# Patient Record
Sex: MALE | Race: WHITE | NOT HISPANIC OR LATINO
[De-identification: names, ages, dates, MRNs, and addresses within clinical notes are randomized per-mention and may not be internally consistent; named-entity substitution may affect disease eponyms.]

---

## 2018-01-16 PROBLEM — Z00.00 ENCOUNTER FOR PREVENTIVE HEALTH EXAMINATION: Status: ACTIVE | Noted: 2018-01-16

## 2018-05-30 ENCOUNTER — APPOINTMENT (OUTPATIENT)
Dept: CARDIOLOGY | Facility: CLINIC | Age: 83
End: 2018-05-30
Payer: COMMERCIAL

## 2018-05-30 ENCOUNTER — NON-APPOINTMENT (OUTPATIENT)
Age: 83
End: 2018-05-30

## 2018-05-30 VITALS
SYSTOLIC BLOOD PRESSURE: 138 MMHG | WEIGHT: 171 LBS | HEART RATE: 56 BPM | BODY MASS INDEX: 24.48 KG/M2 | HEIGHT: 70 IN | DIASTOLIC BLOOD PRESSURE: 75 MMHG | RESPIRATION RATE: 14 BRPM

## 2018-05-30 DIAGNOSIS — Q39.6 CONGENITAL DIVERTICULUM OF ESOPHAGUS: ICD-10-CM

## 2018-05-30 DIAGNOSIS — Z82.49 FAMILY HISTORY OF ISCHEMIC HEART DISEASE AND OTHER DISEASES OF THE CIRCULATORY SYSTEM: ICD-10-CM

## 2018-05-30 DIAGNOSIS — Z85.46 PERSONAL HISTORY OF MALIGNANT NEOPLASM OF PROSTATE: ICD-10-CM

## 2018-05-30 DIAGNOSIS — Z83.3 FAMILY HISTORY OF DIABETES MELLITUS: ICD-10-CM

## 2018-05-30 DIAGNOSIS — Z87.448 PERSONAL HISTORY OF OTHER DISEASES OF URINARY SYSTEM: ICD-10-CM

## 2018-05-30 PROCEDURE — 99213 OFFICE O/P EST LOW 20 MIN: CPT

## 2018-05-30 PROCEDURE — 93000 ELECTROCARDIOGRAM COMPLETE: CPT

## 2018-06-25 ENCOUNTER — LABORATORY RESULT (OUTPATIENT)
Age: 83
End: 2018-06-25

## 2018-06-26 ENCOUNTER — APPOINTMENT (OUTPATIENT)
Dept: CARDIOLOGY | Facility: CLINIC | Age: 83
End: 2018-06-26
Payer: COMMERCIAL

## 2018-06-26 ENCOUNTER — NON-APPOINTMENT (OUTPATIENT)
Age: 83
End: 2018-06-26

## 2018-06-26 VITALS
SYSTOLIC BLOOD PRESSURE: 128 MMHG | HEART RATE: 117 BPM | RESPIRATION RATE: 16 BRPM | DIASTOLIC BLOOD PRESSURE: 81 MMHG | WEIGHT: 181 LBS | HEIGHT: 72 IN | BODY MASS INDEX: 24.52 KG/M2

## 2018-06-26 PROCEDURE — 93000 ELECTROCARDIOGRAM COMPLETE: CPT | Mod: 59

## 2018-06-26 PROCEDURE — 93224 XTRNL ECG REC UP TO 48 HRS: CPT

## 2018-06-26 PROCEDURE — 99214 OFFICE O/P EST MOD 30 MIN: CPT

## 2018-06-29 ENCOUNTER — NON-APPOINTMENT (OUTPATIENT)
Age: 83
End: 2018-06-29

## 2018-07-09 ENCOUNTER — TRANSCRIPTION ENCOUNTER (OUTPATIENT)
Age: 83
End: 2018-07-09

## 2018-07-16 ENCOUNTER — APPOINTMENT (OUTPATIENT)
Dept: CARDIOLOGY | Facility: CLINIC | Age: 83
End: 2018-07-16
Payer: COMMERCIAL

## 2018-07-16 VITALS
RESPIRATION RATE: 16 BRPM | DIASTOLIC BLOOD PRESSURE: 80 MMHG | HEIGHT: 70 IN | SYSTOLIC BLOOD PRESSURE: 130 MMHG | HEART RATE: 54 BPM | WEIGHT: 167 LBS | BODY MASS INDEX: 23.91 KG/M2

## 2018-07-16 PROCEDURE — 99213 OFFICE O/P EST LOW 20 MIN: CPT

## 2018-07-23 ENCOUNTER — APPOINTMENT (OUTPATIENT)
Dept: CARDIOLOGY | Facility: CLINIC | Age: 83
End: 2018-07-23

## 2018-08-13 ENCOUNTER — APPOINTMENT (OUTPATIENT)
Dept: CARDIOLOGY | Facility: CLINIC | Age: 83
End: 2018-08-13
Payer: COMMERCIAL

## 2018-08-13 VITALS
WEIGHT: 167 LBS | DIASTOLIC BLOOD PRESSURE: 64 MMHG | HEART RATE: 44 BPM | BODY MASS INDEX: 23.38 KG/M2 | HEIGHT: 71 IN | SYSTOLIC BLOOD PRESSURE: 124 MMHG | RESPIRATION RATE: 15 BRPM

## 2018-08-13 PROCEDURE — 93306 TTE W/DOPPLER COMPLETE: CPT

## 2018-08-13 PROCEDURE — 99213 OFFICE O/P EST LOW 20 MIN: CPT

## 2018-10-08 ENCOUNTER — LABORATORY RESULT (OUTPATIENT)
Age: 83
End: 2018-10-08

## 2018-10-09 ENCOUNTER — APPOINTMENT (OUTPATIENT)
Dept: CARDIOLOGY | Facility: CLINIC | Age: 83
End: 2018-10-09
Payer: COMMERCIAL

## 2018-10-09 ENCOUNTER — NON-APPOINTMENT (OUTPATIENT)
Age: 83
End: 2018-10-09

## 2018-10-09 VITALS
WEIGHT: 170 LBS | RESPIRATION RATE: 15 BRPM | SYSTOLIC BLOOD PRESSURE: 132 MMHG | HEART RATE: 41 BPM | BODY MASS INDEX: 23.8 KG/M2 | DIASTOLIC BLOOD PRESSURE: 84 MMHG | HEIGHT: 71 IN

## 2018-10-09 PROCEDURE — 99213 OFFICE O/P EST LOW 20 MIN: CPT

## 2018-10-09 PROCEDURE — 93000 ELECTROCARDIOGRAM COMPLETE: CPT

## 2018-10-09 RX ORDER — OXYBUTYNIN CHLORIDE 5 MG/1
5 TABLET ORAL DAILY
Refills: 0 | Status: ACTIVE | COMMUNITY

## 2018-10-09 RX ORDER — TAMSULOSIN HYDROCHLORIDE 0.4 MG/1
0.4 CAPSULE ORAL
Qty: 90 | Refills: 3 | Status: ACTIVE | COMMUNITY
Start: 2018-10-09

## 2018-10-09 RX ORDER — FAMOTIDINE 40 MG/1
40 TABLET, FILM COATED ORAL TWICE DAILY
Refills: 0 | Status: ACTIVE | COMMUNITY
Start: 2018-10-09

## 2018-10-09 RX ORDER — SODIUM BICARBONATE 650 MG/1
650 TABLET ORAL 3 TIMES DAILY
Refills: 0 | Status: ACTIVE | COMMUNITY

## 2018-10-09 RX ORDER — CALCITRIOL 0.25 UG/1
0.25 CAPSULE, LIQUID FILLED ORAL DAILY
Refills: 0 | Status: ACTIVE | COMMUNITY
Start: 2018-10-09

## 2018-10-09 RX ORDER — DOXAZOSIN 1 MG/1
1 TABLET ORAL DAILY
Refills: 0 | Status: ACTIVE | COMMUNITY
Start: 2018-10-09

## 2018-10-12 ENCOUNTER — CLINICAL ADVICE (OUTPATIENT)
Age: 83
End: 2018-10-12

## 2018-12-03 ENCOUNTER — RX CHANGE (OUTPATIENT)
Age: 83
End: 2018-12-03

## 2018-12-26 ENCOUNTER — RX RENEWAL (OUTPATIENT)
Age: 83
End: 2018-12-26

## 2019-03-08 ENCOUNTER — APPOINTMENT (OUTPATIENT)
Dept: CARDIOLOGY | Facility: CLINIC | Age: 84
End: 2019-03-08
Payer: MEDICARE

## 2019-03-08 ENCOUNTER — NON-APPOINTMENT (OUTPATIENT)
Age: 84
End: 2019-03-08

## 2019-03-08 VITALS
DIASTOLIC BLOOD PRESSURE: 64 MMHG | HEART RATE: 41 BPM | RESPIRATION RATE: 16 BRPM | SYSTOLIC BLOOD PRESSURE: 124 MMHG | WEIGHT: 168 LBS | BODY MASS INDEX: 23.52 KG/M2 | HEIGHT: 71 IN

## 2019-03-08 DIAGNOSIS — Z87.891 PERSONAL HISTORY OF NICOTINE DEPENDENCE: ICD-10-CM

## 2019-03-08 PROCEDURE — 99214 OFFICE O/P EST MOD 30 MIN: CPT

## 2019-03-08 PROCEDURE — 93880 EXTRACRANIAL BILAT STUDY: CPT

## 2019-03-08 PROCEDURE — 93000 ELECTROCARDIOGRAM COMPLETE: CPT

## 2019-07-10 ENCOUNTER — APPOINTMENT (OUTPATIENT)
Dept: CARDIOLOGY | Facility: CLINIC | Age: 84
End: 2019-07-10
Payer: MEDICARE

## 2019-07-10 ENCOUNTER — NON-APPOINTMENT (OUTPATIENT)
Age: 84
End: 2019-07-10

## 2019-07-10 VITALS
SYSTOLIC BLOOD PRESSURE: 124 MMHG | RESPIRATION RATE: 15 BRPM | WEIGHT: 168 LBS | HEIGHT: 71 IN | DIASTOLIC BLOOD PRESSURE: 78 MMHG | BODY MASS INDEX: 23.52 KG/M2 | HEART RATE: 48 BPM

## 2019-07-10 DIAGNOSIS — R06.09 OTHER FORMS OF DYSPNEA: ICD-10-CM

## 2019-07-10 PROCEDURE — 99213 OFFICE O/P EST LOW 20 MIN: CPT

## 2019-07-10 PROCEDURE — 93000 ELECTROCARDIOGRAM COMPLETE: CPT

## 2019-07-12 RX ORDER — AVOBENZONE, HOMOSALATE, OCTISALATE, OCTOCRYLENE, AND OXYBENZONE 29.4; 147; 49; 25.4; 58.8 MG/ML; MG/ML; MG/ML; MG/ML; MG/ML
51.7 LOTION TOPICAL DAILY
Refills: 0 | Status: DISCONTINUED | COMMUNITY
Start: 2018-10-09 | End: 2019-07-12

## 2019-08-12 ENCOUNTER — RX RENEWAL (OUTPATIENT)
Age: 84
End: 2019-08-12

## 2019-11-12 ENCOUNTER — NON-APPOINTMENT (OUTPATIENT)
Age: 84
End: 2019-11-12

## 2019-11-12 ENCOUNTER — APPOINTMENT (OUTPATIENT)
Dept: CARDIOLOGY | Facility: CLINIC | Age: 84
End: 2019-11-12
Payer: MEDICARE

## 2019-11-12 VITALS
HEART RATE: 23 BPM | SYSTOLIC BLOOD PRESSURE: 130 MMHG | DIASTOLIC BLOOD PRESSURE: 75 MMHG | WEIGHT: 136 LBS | BODY MASS INDEX: 18.42 KG/M2 | RESPIRATION RATE: 16 BRPM | HEIGHT: 72 IN

## 2019-11-12 PROCEDURE — 99214 OFFICE O/P EST MOD 30 MIN: CPT

## 2019-11-12 PROCEDURE — 93000 ELECTROCARDIOGRAM COMPLETE: CPT

## 2019-11-12 RX ORDER — METOPROLOL TARTRATE 25 MG/1
25 TABLET, FILM COATED ORAL
Qty: 90 | Refills: 1 | Status: COMPLETED | COMMUNITY
Start: 2018-07-16 | End: 2019-11-12

## 2020-08-12 ENCOUNTER — APPOINTMENT (OUTPATIENT)
Dept: CARDIOLOGY | Facility: CLINIC | Age: 85
End: 2020-08-12
Payer: MEDICARE

## 2020-08-12 ENCOUNTER — NON-APPOINTMENT (OUTPATIENT)
Age: 85
End: 2020-08-12

## 2020-08-12 VITALS
HEART RATE: 46 BPM | BODY MASS INDEX: 22.48 KG/M2 | WEIGHT: 166 LBS | DIASTOLIC BLOOD PRESSURE: 78 MMHG | SYSTOLIC BLOOD PRESSURE: 124 MMHG | RESPIRATION RATE: 15 BRPM | HEIGHT: 72 IN

## 2020-08-12 PROCEDURE — 99214 OFFICE O/P EST MOD 30 MIN: CPT

## 2020-08-12 PROCEDURE — 93000 ELECTROCARDIOGRAM COMPLETE: CPT

## 2020-08-12 PROCEDURE — 93306 TTE W/DOPPLER COMPLETE: CPT

## 2020-08-18 RX ORDER — METOPROLOL SUCCINATE 25 MG/1
25 TABLET, EXTENDED RELEASE ORAL DAILY
Qty: 45 | Refills: 1 | Status: DISCONTINUED | OUTPATIENT
Start: 2019-11-12 | End: 2020-08-18

## 2020-10-27 ENCOUNTER — APPOINTMENT (OUTPATIENT)
Dept: CARDIOLOGY | Facility: CLINIC | Age: 85
End: 2020-10-27
Payer: MEDICARE

## 2020-10-27 VITALS
HEART RATE: 47 BPM | WEIGHT: 165 LBS | DIASTOLIC BLOOD PRESSURE: 80 MMHG | SYSTOLIC BLOOD PRESSURE: 135 MMHG | OXYGEN SATURATION: 97 % | RESPIRATION RATE: 16 BRPM

## 2020-10-27 PROCEDURE — 99213 OFFICE O/P EST LOW 20 MIN: CPT

## 2021-01-23 ENCOUNTER — TRANSCRIPTION ENCOUNTER (OUTPATIENT)
Age: 86
End: 2021-01-23

## 2021-01-25 ENCOUNTER — NON-APPOINTMENT (OUTPATIENT)
Age: 86
End: 2021-01-25

## 2021-01-25 ENCOUNTER — APPOINTMENT (OUTPATIENT)
Dept: CARDIOLOGY | Facility: CLINIC | Age: 86
End: 2021-01-25
Payer: MEDICARE

## 2021-01-25 VITALS
HEIGHT: 72 IN | TEMPERATURE: 97.6 F | RESPIRATION RATE: 15 BRPM | SYSTOLIC BLOOD PRESSURE: 135 MMHG | WEIGHT: 165 LBS | DIASTOLIC BLOOD PRESSURE: 85 MMHG | BODY MASS INDEX: 22.35 KG/M2 | HEART RATE: 40 BPM

## 2021-01-25 DIAGNOSIS — I65.29 OCCLUSION AND STENOSIS OF UNSPECIFIED CAROTID ARTERY: ICD-10-CM

## 2021-01-25 PROCEDURE — 99213 OFFICE O/P EST LOW 20 MIN: CPT

## 2021-01-25 PROCEDURE — 93000 ELECTROCARDIOGRAM COMPLETE: CPT

## 2021-01-26 RX ORDER — ERYTHROPOIETIN 10000 [IU]/ML
10000 INJECTION, SOLUTION INTRAVENOUS; SUBCUTANEOUS
Refills: 0 | Status: COMPLETED | COMMUNITY
Start: 2019-11-12 | End: 2021-01-26

## 2021-05-18 ENCOUNTER — APPOINTMENT (OUTPATIENT)
Dept: CARDIOLOGY | Facility: CLINIC | Age: 86
End: 2021-05-18
Payer: MEDICARE

## 2021-05-18 VITALS
TEMPERATURE: 97 F | HEART RATE: 55 BPM | HEIGHT: 72 IN | WEIGHT: 163 LBS | OXYGEN SATURATION: 97 % | BODY MASS INDEX: 22.08 KG/M2

## 2021-05-18 DIAGNOSIS — N18.4 CHRONIC KIDNEY DISEASE, STAGE 4 (SEVERE): ICD-10-CM

## 2021-05-18 PROCEDURE — 99214 OFFICE O/P EST MOD 30 MIN: CPT

## 2021-05-18 NOTE — PHYSICAL EXAM
[General Appearance - Well Developed] : well developed [Normal Appearance] : normal appearance [Well Groomed] : well groomed [General Appearance - Well Nourished] : well nourished [No Deformities] : no deformities [General Appearance - In No Acute Distress] : no acute distress [Normal Conjunctiva] : the conjunctiva exhibited no abnormalities [Normal Oral Mucosa] : normal oral mucosa [Normal Oropharynx] : normal oropharynx [Normal Jugular Venous A Waves Present] : normal jugular venous A waves present [Normal Jugular Venous V Waves Present] : normal jugular venous V waves present [No Jugular Venous Hunter A Waves] : no jugular venous hunter A waves [Respiration, Rhythm And Depth] : normal respiratory rhythm and effort [] : no respiratory distress [Exaggerated Use Of Accessory Muscles For Inspiration] : no accessory muscle use [Auscultation Breath Sounds / Voice Sounds] : lungs were clear to auscultation bilaterally [Bowel Sounds] : normal bowel sounds [Abdomen Soft] : soft [Abnormal Walk] : normal gait [Nail Clubbing] : no clubbing of the fingernails [Cyanosis, Localized] : no localized cyanosis [Skin Color & Pigmentation] : normal skin color and pigmentation [Skin Turgor] : normal skin turgor [Oriented To Time, Place, And Person] : oriented to person, place, and time [Affect] : the affect was normal [Mood] : the mood was normal [No Anxiety] : not feeling anxious [5th Left ICS - MCL] : palpated at the 5th LICS in the midclavicular line [Normal] : normal [No Precordial Heave] : no precordial heave was noted [Bradycardia] : bradycardic [Heart Rate ___] : [unfilled] bpm [Rhythm Regular] : regular [Normal S1] : normal S1 [Normal S2] : normal S2 [No Gallop] : no gallop heard [I] : a grade 1 [2+] : left 2+ [No Abnormalities] : the abdominal aorta was not enlarged and no bruit was heard [___ +] : [unfilled]U+ pitting edema to the right ankle [FreeTextEntry1] : R arm amputation [Apical Thrill] : no thrill palpable at the apex [S3] : no S3 [S4] : no S4 [Click] : no click [Pericardial Rub] : no pericardial rub [Right Carotid Bruit] : no bruit heard over the right carotid [Left Carotid Bruit] : no bruit heard over the left carotid

## 2021-05-18 NOTE — REASON FOR VISIT
[CV Risk Factors and Non-Cardiac Disease] : CV risk factors and non-cardiac disease [Follow-Up - Clinic] : a clinic follow-up of [Hypertension] : hypertension [Palpitations] : palpitations [Spouse] : spouse [FreeTextEntry1] : murmur, right lower extremity edema, bradycardia

## 2021-05-18 NOTE — DISCUSSION/SUMMARY
[FreeTextEntry1] : This is an 85 year-old male with past medical history significant for status post right arm amputation, renal insufficiency, mitral valve regurgitation, hyperlipidemia, who comes in for followup cardiac evaluation. He denies chest pain, shortness of breath, dizziness or syncope. \par The patient remained stable on his current medical therapy.  He had blood work done March 19 by his primary care physician and demonstrated elevated creatinine of 4.1, BUN of 73, cholesterol 137, triglycerides 49, HDL of 58, calculated LDL 79 mg/dL and hemoglobin A1c of 5.5.\par Given this patient's cardiac risk profile, I recommend he start Pravachol 40 mgPer day.  Per day per day per day he will have follow-up blood work in 6 to 8 weeks.\par \par The patient has been stable on his current medical therapy.  His blood pressure is under reasonable control.\par He had an echo Doppler examination done October 12, 2020 which demonstrated calcified mitral annulus and mild mitral valve regurgitation, mild tricuspid valve regurgitation, sigmoid basal interventricular septum, aortic valve stenosis with a gradient consistent with mild aortic valve stenosis and mild aortic valve regurgitation.  There was thinning and dyskinesis of the the interatrial septum. \par Electrocardiogram done January 25, 2021 demonstrates sinus bradycardia rate of 41 bpm is otherwise remarkable for first-degree block.\par The patient reports that he is receiving Procrit injections by his nephrologist has also received iron infusion.\par The patient understands that aerobic exercises must be increased to 40 minutes 4 times per week. A detailed discussion of lifestyle modification was done today. The patient has a good understanding of the diagnosis, and treatment plan. Lifestyle modification was also outlined.

## 2021-07-09 ENCOUNTER — NON-APPOINTMENT (OUTPATIENT)
Age: 86
End: 2021-07-09

## 2021-07-14 ENCOUNTER — NON-APPOINTMENT (OUTPATIENT)
Age: 86
End: 2021-07-14

## 2021-08-17 ENCOUNTER — APPOINTMENT (OUTPATIENT)
Dept: CARDIOLOGY | Facility: CLINIC | Age: 86
End: 2021-08-17
Payer: MEDICARE

## 2021-08-17 ENCOUNTER — NON-APPOINTMENT (OUTPATIENT)
Age: 86
End: 2021-08-17

## 2021-08-17 VITALS
RESPIRATION RATE: 16 BRPM | BODY MASS INDEX: 23.03 KG/M2 | SYSTOLIC BLOOD PRESSURE: 128 MMHG | TEMPERATURE: 98.1 F | WEIGHT: 170 LBS | HEIGHT: 72 IN | DIASTOLIC BLOOD PRESSURE: 78 MMHG | OXYGEN SATURATION: 96 % | HEART RATE: 80 BPM

## 2021-08-17 PROCEDURE — 93000 ELECTROCARDIOGRAM COMPLETE: CPT

## 2021-08-17 PROCEDURE — 99214 OFFICE O/P EST MOD 30 MIN: CPT | Mod: 25

## 2021-08-17 NOTE — PHYSICAL EXAM
[Well Developed] : well developed [Well Nourished] : well nourished [No Acute Distress] : no acute distress [Normal Venous Pressure] : normal venous pressure [No Carotid Bruit] : no carotid bruit [Normal S1, S2] : normal S1, S2 [No Murmur] : no murmur [No Rub] : no rub [Clear Lung Fields] : clear lung fields [Good Air Entry] : good air entry [No Respiratory Distress] : no respiratory distress  [Soft] : abdomen soft [Non Tender] : non-tender [No Masses/organomegaly] : no masses/organomegaly [Normal Bowel Sounds] : normal bowel sounds [Normal Gait] : normal gait [No Edema] : no edema [No Cyanosis] : no cyanosis [No Clubbing] : no clubbing [No Varicosities] : no varicosities [No Rash] : no rash [No Skin Lesions] : no skin lesions [Moves all extremities] : moves all extremities [No Focal Deficits] : no focal deficits [Normal Speech] : normal speech [Alert and Oriented] : alert and oriented [Normal memory] : normal memory [General Appearance - Well Developed] : well developed [Normal Appearance] : normal appearance [Well Groomed] : well groomed [General Appearance - Well Nourished] : well nourished [No Deformities] : no deformities [Normal Conjunctiva] : the conjunctiva exhibited no abnormalities [General Appearance - In No Acute Distress] : no acute distress [Normal Oral Mucosa] : normal oral mucosa [Normal Oropharynx] : normal oropharynx [Normal Jugular Venous A Waves Present] : normal jugular venous A waves present [Normal Jugular Venous V Waves Present] : normal jugular venous V waves present [No Jugular Venous Hunter A Waves] : no jugular venous hunter A waves [] : no respiratory distress [Respiration, Rhythm And Depth] : normal respiratory rhythm and effort [Exaggerated Use Of Accessory Muscles For Inspiration] : no accessory muscle use [Auscultation Breath Sounds / Voice Sounds] : lungs were clear to auscultation bilaterally [Bowel Sounds] : normal bowel sounds [Abdomen Soft] : soft [Abnormal Walk] : normal gait [Nail Clubbing] : no clubbing of the fingernails [Cyanosis, Localized] : no localized cyanosis [Skin Color & Pigmentation] : normal skin color and pigmentation [Skin Turgor] : normal skin turgor [Oriented To Time, Place, And Person] : oriented to person, place, and time [Affect] : the affect was normal [Mood] : the mood was normal [No Anxiety] : not feeling anxious [5th Left ICS - MCL] : palpated at the 5th LICS in the midclavicular line [Normal] : normal [No Precordial Heave] : no precordial heave was noted [Bradycardia] : bradycardic [Heart Rate ___] : [unfilled] bpm [Rhythm Regular] : regular [Normal S1] : normal S1 [Normal S2] : normal S2 [No Gallop] : no gallop heard [I] : a grade 1 [2+] : left 2+ [No Abnormalities] : the abdominal aorta was not enlarged and no bruit was heard [___ +] : [unfilled]U+ pitting edema to the right ankle [FreeTextEntry1] : R arm amputation [Apical Thrill] : no thrill palpable at the apex [S3] : no S3 [S4] : no S4 [Click] : no click [Pericardial Rub] : no pericardial rub [Right Carotid Bruit] : no bruit heard over the right carotid [Left Carotid Bruit] : no bruit heard over the left carotid

## 2021-08-17 NOTE — CARDIOLOGY SUMMARY
[___] : [unfilled] [de-identified] : 8/17/2021 [de-identified] : HOLTER: 6/29/2018 [de-identified] : NUC: 1/15/2018 [de-identified] : 8/12/2020

## 2021-08-17 NOTE — DISCUSSION/SUMMARY
[FreeTextEntry1] : Dr. Mendoza-(PRIOR VISIT and PMH WITH Dr. Mendoza FROM 5/18/2021):\par This is an 85 year-old male with past medical history significant for status post right arm amputation, renal insufficiency, mitral valve regurgitation, hyperlipidemia, who comes in for followup cardiac evaluation. He denies chest pain, shortness of breath, dizziness or syncope. \par \par The patient remained stable on his current medical therapy.  He had blood work done March 19 by his primary care physician and demonstrated elevated creatinine of 4.1, BUN of 73, cholesterol 137, triglycerides 49, HDL of 58, calculated LDL 79 mg/dL and hemoglobin A1c of 5.5.\par Given this patient's cardiac risk profile, I recommend he start Pravachol 40 mgPer day.  Per day per day per day he will have follow-up blood work in 6 to 8 weeks.\par \par The patient has been stable on his current medical therapy.  His blood pressure is under reasonable control.\par \par He had an echo Doppler examination done October 12, 2020 which demonstrated calcified mitral annulus and mild mitral valve regurgitation, mild tricuspid valve regurgitation, sigmoid basal interventricular septum, aortic valve stenosis with a gradient consistent with mild aortic valve stenosis and mild aortic valve regurgitation.  There was thinning and dyskinesis of the the interatrial septum. \par \par Electrocardiogram done January 25, 2021 demonstrates sinus bradycardia rate of 41 bpm is otherwise remarkable for first-degree block.\par \par The patient reports that he is receiving Procrit injections by his nephrologist has also received iron infusion.\par \par The patient understands that aerobic exercises must be increased to 40 minutes 4 times per week. A detailed discussion of lifestyle modification was done today. The patient has a good understanding of the diagnosis, and treatment plan. Lifestyle modification was also outlined.

## 2021-08-17 NOTE — REASON FOR VISIT
[CV Risk Factors and Non-Cardiac Disease] : CV risk factors and non-cardiac disease [Follow-Up - Clinic] : a clinic follow-up of [Palpitations] : palpitations [Spouse] : spouse [Arrhythmia/ECG Abnorrmalities] : arrhythmia/ECG abnormalities [Hyperlipidemia] : hyperlipidemia [Hypertension] : hypertension [FreeTextEntry1] : murmur, right lower extremity edema, bradycardia

## 2021-08-17 NOTE — ASSESSMENT
[FreeTextEntry1] : This is a 86 year year old male here today for follow up cardiac evaluation. \par He has a past medical history significant for status post right arm amputation, renal insufficiency, mitral valve regurgitation, hyperlipidemia.\par \par \par CHIEF COMPLAINT:\par Today he is feeling generally well and does not have any complaints at this time. He states that he was at his PCP's office yesterday for clearance for an endoscopy and his PCP stated he had an abnormal EKG and he was told he must follow up with his cardiologist.\par \par BLOOD PRESSURE:\par -BP is well controlled in today's visit and patient is on Amlodipine 5mg PO DAILY and Metoprolol tartrate 25mg PO DAILY. \par \par -I have discussed the importance of maintaining good BP control and reviewed the newest guidelines with the patient while re-enforcing dietary sodium restrictions to no more than 2-3 g daily, DASH diet, life style modifications as well as the goal of maintaining ideal body weight with the patient today. I have advised the patient to avoid the use of over-the-counter medications/ supplements especially NSAIDS.\par \par I have reviewed with Mr. CASTILLO that serious health consequences can occur when blood pressure is not well controlled and the need for strict compliance with medication and that optimal control can significantly reduce the risk of cardiovascular disease stroke, heart attack and other organ damage. They verbally expressed understanding of the fore mentioned serious health consequences to me today.\par \par BLOOD WORK:\par -New blood work was done  7/6/2021 which demonstrated normal lipid profile. He is following up with his renal doctor in regards to his kidney function. He remains on Pravastatin 40mg PO DAILY. \par \par CHOLESTEROL CONTROL:\par -Patient will continue the advised  TLC diet and to continue follow-up for treatment of hyperlipidemia and repeat blood testing with diet and exercise. I have discussed different exercises and the importance of maintenance of optimal body weight. The importance of staying within guidelines and recommendations was stressed to the patient today and they acknowledged that they understand this to me verbally.\par \par  -Mr. CASTILLO was educated and advised that failure to follow-up with my medical recommendations to lower cholesterol can result in severe health consequences therefore, they will continuing a low saturated and low fat diet and to avoid excessive carbohydrates to help reduce triglycerides and that lowering LDL levels is associated with a significant decrease in serious cardiac events including but not limited to heart attack stroke and overall death. We will continue lipid lowering agents as advised based on blood test results and the patient understands to call if they develop severe muscle discomfort or if they have a reddish tinted discoloration in their urine.\par \par TESTING/REPORTS:\par -EKG done Aug 17, 2021 which demonstrated regular sinus rhythm with nonspecific ST-T wave changes, BPM of 51 with 1st degree AV block (unchanged from his baseline EKG). \par \par -EKG from his PCP office demonstrated HR of 99 with possible ST depressions. He states he is not having chest pains or HIDALGO nor did he have any yesterday at the time of his visit. \par \par -He had an echo Doppler examination done October 12, 2020 which demonstrated calcified mitral annulus and mild mitral valve regurgitation, mild tricuspid valve regurgitation, sigmoid basal interventricular septum, aortic valve stenosis with a gradient consistent with mild aortic valve stenosis and mild aortic valve regurgitation.  There was thinning and dyskinesis of the the interatrial septum. \par \par PLAN:\par -24 Holter monitor\par -He will continue with his usual medications and will contact the office if he is having any complaints between now and their next follow up appointment.\par  \par I have discussed the plan of care with Mr. KIEL CASTILLO  and he  will follow up in 1 month. He is compliant with all of his medications.\par \par The patient understands that aerobic exercises must be increased to minutes 4 times/week and a detailed discussion of lifestyle modification was done today. \par The patient has a good understanding of the diagnosis, treatment plan and lifestyle modification. \par He will contact me at the office for any questions with their care or any changes in their health status.\par \par The patient was seen together with supervision physician Dr. George Mendoza and the plan of care was discussed with the patient and will be carried out as noted above.\par \par Bunny RALPH

## 2021-08-18 ENCOUNTER — APPOINTMENT (OUTPATIENT)
Dept: CARDIOLOGY | Facility: CLINIC | Age: 86
End: 2021-08-18

## 2021-08-23 ENCOUNTER — NON-APPOINTMENT (OUTPATIENT)
Age: 86
End: 2021-08-23

## 2021-08-23 PROCEDURE — 93224 XTRNL ECG REC UP TO 48 HRS: CPT

## 2021-08-31 ENCOUNTER — NON-APPOINTMENT (OUTPATIENT)
Age: 86
End: 2021-08-31

## 2021-08-31 ENCOUNTER — APPOINTMENT (OUTPATIENT)
Dept: ELECTROPHYSIOLOGY | Facility: CLINIC | Age: 86
End: 2021-08-31
Payer: MEDICARE

## 2021-08-31 VITALS — HEART RATE: 53 BPM | SYSTOLIC BLOOD PRESSURE: 161 MMHG | OXYGEN SATURATION: 96 % | DIASTOLIC BLOOD PRESSURE: 81 MMHG

## 2021-08-31 PROCEDURE — 99205 OFFICE O/P NEW HI 60 MIN: CPT

## 2021-08-31 PROCEDURE — 93000 ELECTROCARDIOGRAM COMPLETE: CPT

## 2021-09-01 NOTE — DISCUSSION/SUMMARY
[FreeTextEntry1] : 86 year old man with periods of tachycardia on his loop monitor. 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  Why there is some concern about the duration of the arrhythmia (almost 1 hour) the rate was 120 bpm  and well tolerated. We discussed the option of EP testing with an aim at ablation.  However after our discussion we have decided to follow more conservatively.

## 2021-09-01 NOTE — CARDIOLOGY SUMMARY
[de-identified] : today: Sinus  Bradycardia  -First degree A-V block  -With rate variation \par Norberto = 256 [de-identified] : 24 hour holter (8/17/2021)\par sinus 31 - 77, mean of 54 bpm\par frequent atrial ectopy (9.34%) [de-identified] : TTE August 12, 2020\par MAC with MILD MR\par Calcified trileaflet aortic valve with normal opening.  Peak transaortic valve gradient equals 26 mmHg, mean of 14, c/w mild AS. Mild AR.\par Sigmoid basal interventricular septum\par Normal LV systolic function. No segmental wall motion abnormality.\par Mild TR\par Thinning and dyskinesis of the intraatrial septum.

## 2021-09-01 NOTE — REASON FOR VISIT
[Arrhythmia/ECG Abnorrmalities] : arrhythmia/ECG abnormalities [Spouse] : spouse [FreeTextEntry3] : George Mendoza

## 2021-09-01 NOTE — HISTORY OF PRESENT ILLNESS
[FreeTextEntry1] : Mr. Che is an 86 year old man with a history of RUE amputation, renal insufficiency, aortic stenosis, mitral valve regurgitation, HTN, hyperlidemia, sinus bradycardia, and first degree AV block, who presents today for an initial evaluation regarding his palpitations.  \par \par He has no complaints. However he was going to get a colonoscopy and on the screening exam he was found to have an abnormal ECG.  He was referred to cardiology.  He was told to wear a holter monitor.  He has no cardiac complaints.  On questioning he admits to infrequent palpitations, but he describes them as mild and very brief (< 5 minutes). No chest pain. No shortness of breath. NO lightheadedness.dizziness.  He has no specific exercise regimen.  This 24 hour Holter study revealed sinus rhythm, with an average HR of 54bpm (range: 31-77bpm, Bradycardia 47.7%), Rare VPCs (<0.1% burden), Frequent APCs and couplets (9.34%).  2 runs of Atrial tachycardia recorded.

## 2021-09-01 NOTE — PHYSICAL EXAM
[Well Developed] : well developed [Well Nourished] : well nourished [No Acute Distress] : no acute distress [Normal Venous Pressure] : normal venous pressure [No Carotid Bruit] : no carotid bruit [Normal S1, S2] : normal S1, S2 [No Murmur] : no murmur [No Rub] : no rub [Clear Lung Fields] : clear lung fields [Good Air Entry] : good air entry [No Respiratory Distress] : no respiratory distress  [Soft] : abdomen soft [Non Tender] : non-tender [No Masses/organomegaly] : no masses/organomegaly [Normal Bowel Sounds] : normal bowel sounds [Normal Gait] : normal gait [No Edema] : no edema [No Cyanosis] : no cyanosis [No Clubbing] : no clubbing [No Varicosities] : no varicosities [No Rash] : no rash [No Skin Lesions] : no skin lesions [Moves all extremities] : moves all extremities [No Focal Deficits] : no focal deficits [Normal Speech] : normal speech [Alert and Oriented] : alert and oriented [Normal memory] : normal memory [General Appearance - Well Developed] : well developed [Normal Appearance] : normal appearance [Well Groomed] : well groomed [General Appearance - Well Nourished] : well nourished [No Deformities] : no deformities [General Appearance - In No Acute Distress] : no acute distress [Normal Conjunctiva] : the conjunctiva exhibited no abnormalities [Normal Oral Mucosa] : normal oral mucosa [Normal Oropharynx] : normal oropharynx [Normal Jugular Venous A Waves Present] : normal jugular venous A waves present [Normal Jugular Venous V Waves Present] : normal jugular venous V waves present [No Jugular Venous Hunter A Waves] : no jugular venous hunter A waves [] : no respiratory distress [Respiration, Rhythm And Depth] : normal respiratory rhythm and effort [Exaggerated Use Of Accessory Muscles For Inspiration] : no accessory muscle use [Auscultation Breath Sounds / Voice Sounds] : lungs were clear to auscultation bilaterally [Bowel Sounds] : normal bowel sounds [Abdomen Soft] : soft [Abnormal Walk] : normal gait [Nail Clubbing] : no clubbing of the fingernails [FreeTextEntry1] : R arm amputation [Cyanosis, Localized] : no localized cyanosis [Skin Color & Pigmentation] : normal skin color and pigmentation [Skin Turgor] : normal skin turgor [Oriented To Time, Place, And Person] : oriented to person, place, and time [Affect] : the affect was normal [Mood] : the mood was normal [No Anxiety] : not feeling anxious [5th Left ICS - MCL] : palpated at the 5th LICS in the midclavicular line [Normal] : normal [No Precordial Heave] : no precordial heave was noted [Apical Thrill] : no thrill palpable at the apex [Bradycardia] : bradycardic [Heart Rate ___] : [unfilled] bpm [Rhythm Regular] : regular [Normal S1] : normal S1 [Normal S2] : normal S2 [No Gallop] : no gallop heard [S3] : no S3 [S4] : no S4 [Click] : no click [Pericardial Rub] : no pericardial rub [I] : a grade 1 [Right Carotid Bruit] : no bruit heard over the right carotid [Left Carotid Bruit] : no bruit heard over the left carotid [2+] : left 2+ [No Abnormalities] : the abdominal aorta was not enlarged and no bruit was heard [___ +] : [unfilled]U+ pitting edema to the right ankle

## 2021-10-08 ENCOUNTER — NON-APPOINTMENT (OUTPATIENT)
Age: 86
End: 2021-10-08

## 2021-10-08 ENCOUNTER — APPOINTMENT (OUTPATIENT)
Dept: CARDIOLOGY | Facility: CLINIC | Age: 86
End: 2021-10-08
Payer: MEDICARE

## 2021-10-08 VITALS
WEIGHT: 164 LBS | TEMPERATURE: 97.3 F | BODY MASS INDEX: 22.21 KG/M2 | HEIGHT: 72 IN | OXYGEN SATURATION: 99 % | HEART RATE: 63 BPM | RESPIRATION RATE: 16 BRPM

## 2021-10-08 DIAGNOSIS — R60.0 LOCALIZED EDEMA: ICD-10-CM

## 2021-10-08 PROCEDURE — 99214 OFFICE O/P EST MOD 30 MIN: CPT | Mod: 25

## 2021-10-08 PROCEDURE — 93000 ELECTROCARDIOGRAM COMPLETE: CPT

## 2021-10-08 NOTE — REVIEW OF SYSTEMS
[Negative] : Heme/Lymph [FreeTextEntry9] : LUE edema/hematoma [de-identified] : LUE hematoma/edema

## 2021-10-08 NOTE — ASSESSMENT
[FreeTextEntry1] : This is a 86 year year old male here today for follow up cardiac evaluation. \par He has a past medical history significant for status post right arm amputation, renal insufficiency, mitral valve regurgitation, hyperlipidemia.\par \par CHIEF COMPLAINT:\par The patient is here today after s/p fall and hospitalization at [facility] from 9/27/2021 to 10/1/2021. He is here with his wife who states that he slipped on a cardboard box in his driveway. He suffered from LUE injuries (no break but severe swelling and hematoma). He states that his left hand is very swollen and hurts to touch. At times he does have some numbness with pain. The swelling goes from the back of his arm to his fingertips and he does have it wrapped at this time. He states that he does not have anyone following up with his arm and that he was told "it would take time for the swelling and bruising to go down". \par \par He also recently had a 24 hour holter monitor in place prior to his fall on 8/17/2021 which demonstrated NSR with average HR in the 50's, lowest HR 31 BPM with bradycardia occurring 47% of the time. He does follow up with [facility] Dr. Blair and he states that while in the hospital his Metoprolol tartrate dose was changed from 12.5mg PO DAILY to 25mg PO TID. He said that he was told his HR "went really high in the hospital". \par \par As of right now he does not have chest pain or HIDALGO. He does not have palpitations. His primary complaint right now is the pain and swelling in his LUE.\par \par BLOOD PRESSURE:\par Unable to do blood pressure due to LUE arm pain and swelling. \par \par BLOOD WORK:\par -New blood work was done 9/17/2021 which demonstrated normal lipid profile.\par -He is following up with his renal doctor in regards to his kidney function. He remains on Pravastatin 40mg PO DAILY. \par \par CHOLESTEROL CONTROL:\par -Patient will continue the advised  TLC diet and to continue follow-up for treatment of hyperlipidemia and repeat blood testing with diet and exercise. I have discussed different exercises and the importance of maintenance of optimal body weight. The importance of staying within guidelines and recommendations was stressed to the patient today and they acknowledged that they understand this to me verbally.\par \par  -Mr. CASTILLO was educated and advised that failure to follow-up with my medical recommendations to lower cholesterol can result in severe health consequences therefore, they will continuing a low saturated and low fat diet and to avoid excessive carbohydrates to help reduce triglycerides and that lowering LDL levels is associated with a significant decrease in serious cardiac events including but not limited to heart attack stroke and overall death. We will continue lipid lowering agents as advised based on blood test results and the patient understands to call if they develop severe muscle discomfort or if they have a reddish tinted discoloration in their urine.\par \par TESTING/REPORTS:\par -EKG done today 10/08/2021 which demonstrated regular sinus rhythm with nonspecific ST-T wave changes BPM of 47 with 1st degree AV block. \par \par -EKG done Aug 17, 2021 which demonstrated regular sinus rhythm with nonspecific ST-T wave changes, BPM of 51 with 1st degree AV block (unchanged from his baseline EKG). \par \par -He had an echo Doppler examination done October 12, 2020 which demonstrated calcified mitral annulus and mild mitral valve regurgitation, mild tricuspid valve regurgitation, sigmoid basal interventricular septum, aortic valve stenosis with a gradient consistent with mild aortic valve stenosis and mild aortic valve regurgitation.  There was thinning and dyskinesis of the the interatrial septum. \par \par -The patient had a 24 hour holter monitor done on 8/17/2021 which demonstrated NSR with bradycardia 47% of he time with HR ranges 31-77 (average 54 BPM). The patient does follow up closely with Dr. Murphy in [facility].\par \par -WALDEMAR done during his hospitalization.\par \par PLAN:\par -Start Metoprolol ER 12.5mg PO BID in place of the 25mg PO TID.\par -Follow up with EPS. Dr Murphy. \par -Follow up with Vascular and I provided him with Dr. Trevino group to evaluate his LUE.\par \par I have discussed the plan of care with Mr. KIEL CASTILLO  and he  will follow up in 1 month. He is compliant with all of his medications.\par \par The patient understands that aerobic exercises must be increased to minutes 4 times/week and a detailed discussion of lifestyle modification was done today. \par The patient has a good understanding of the diagnosis, treatment plan and lifestyle modification. \par He will contact me at the office for any questions with their care or any changes in their health status.\par \par The patient was seen together with supervision physician Dr. George Mendoza and the plan of care was discussed with the patient and will be carried out as noted above.\par \par Bunny RALPH

## 2021-10-08 NOTE — PHYSICAL EXAM
[Well Developed] : well developed [Well Nourished] : well nourished [No Acute Distress] : no acute distress [Normal Venous Pressure] : normal venous pressure [No Carotid Bruit] : no carotid bruit [Normal S1, S2] : normal S1, S2 [No Murmur] : no murmur [No Rub] : no rub [Clear Lung Fields] : clear lung fields [Good Air Entry] : good air entry [No Respiratory Distress] : no respiratory distress  [Soft] : abdomen soft [Non Tender] : non-tender [No Masses/organomegaly] : no masses/organomegaly [Normal Bowel Sounds] : normal bowel sounds [Normal Gait] : normal gait [No Edema] : no edema [No Cyanosis] : no cyanosis [No Clubbing] : no clubbing [No Varicosities] : no varicosities [No Rash] : no rash [No Skin Lesions] : no skin lesions [Moves all extremities] : moves all extremities [No Focal Deficits] : no focal deficits [Normal Speech] : normal speech [Alert and Oriented] : alert and oriented [Normal memory] : normal memory [General Appearance - Well Developed] : well developed [Normal Appearance] : normal appearance [Well Groomed] : well groomed [General Appearance - Well Nourished] : well nourished [No Deformities] : no deformities [General Appearance - In No Acute Distress] : no acute distress [Normal Conjunctiva] : the conjunctiva exhibited no abnormalities [Normal Oral Mucosa] : normal oral mucosa [Normal Oropharynx] : normal oropharynx [Normal Jugular Venous A Waves Present] : normal jugular venous A waves present [Normal Jugular Venous V Waves Present] : normal jugular venous V waves present [No Jugular Venous Hunter A Waves] : no jugular venous hunter A waves [] : no respiratory distress [Respiration, Rhythm And Depth] : normal respiratory rhythm and effort [Exaggerated Use Of Accessory Muscles For Inspiration] : no accessory muscle use [Auscultation Breath Sounds / Voice Sounds] : lungs were clear to auscultation bilaterally [Bowel Sounds] : normal bowel sounds [Abdomen Soft] : soft [Abnormal Walk] : normal gait [Nail Clubbing] : no clubbing of the fingernails [Cyanosis, Localized] : no localized cyanosis [Skin Color & Pigmentation] : normal skin color and pigmentation [Skin Turgor] : normal skin turgor [Oriented To Time, Place, And Person] : oriented to person, place, and time [Mood] : the mood was normal [Affect] : the affect was normal [No Anxiety] : not feeling anxious [5th Left ICS - MCL] : palpated at the 5th LICS in the midclavicular line [Normal] : normal [No Precordial Heave] : no precordial heave was noted [Bradycardia] : bradycardic [Heart Rate ___] : [unfilled] bpm [Rhythm Regular] : regular [Normal S1] : normal S1 [Normal S2] : normal S2 [No Gallop] : no gallop heard [I] : a grade 1 [2+] : left 2+ [No Abnormalities] : the abdominal aorta was not enlarged and no bruit was heard [___ +] : [unfilled]U+ pitting edema to the right ankle [FreeTextEntry1] : R arm amputation, LUE edema, hematoma [Apical Thrill] : no thrill palpable at the apex [S3] : no S3 [S4] : no S4 [Click] : no click [Pericardial Rub] : no pericardial rub [Right Carotid Bruit] : no bruit heard over the right carotid [Left Carotid Bruit] : no bruit heard over the left carotid

## 2021-10-08 NOTE — REASON FOR VISIT
[CV Risk Factors and Non-Cardiac Disease] : CV risk factors and non-cardiac disease [Arrhythmia/ECG Abnorrmalities] : arrhythmia/ECG abnormalities [Hyperlipidemia] : hyperlipidemia [Follow-Up - Clinic] : a clinic follow-up of [Hypertension] : hypertension [Palpitations] : palpitations [Spouse] : spouse [FreeTextEntry1] : murmur, right lower extremity edema, bradycardia

## 2021-10-08 NOTE — CARDIOLOGY SUMMARY
[___] : [unfilled] [de-identified] : 8/17/2021 [de-identified] : HOLTER: 6/29/2018, 8/17/2021 [de-identified] : NUC: 1/15/2018 [de-identified] : 8/12/2020

## 2021-10-12 RX ORDER — ERYTHROPOIETIN 20000 [IU]/ML
20000 INJECTION, SOLUTION INTRAVENOUS; SUBCUTANEOUS
Refills: 0 | Status: ACTIVE | COMMUNITY
Start: 2021-09-17

## 2021-10-14 ENCOUNTER — APPOINTMENT (OUTPATIENT)
Dept: VASCULAR SURGERY | Facility: CLINIC | Age: 86
End: 2021-10-14

## 2021-11-02 ENCOUNTER — NON-APPOINTMENT (OUTPATIENT)
Age: 86
End: 2021-11-02

## 2021-11-02 ENCOUNTER — APPOINTMENT (OUTPATIENT)
Dept: CARDIOLOGY | Facility: CLINIC | Age: 86
End: 2021-11-02
Payer: MEDICARE

## 2021-11-02 VITALS
WEIGHT: 171 LBS | HEIGHT: 72 IN | BODY MASS INDEX: 23.16 KG/M2 | DIASTOLIC BLOOD PRESSURE: 75 MMHG | HEART RATE: 51 BPM | TEMPERATURE: 98 F | RESPIRATION RATE: 15 BRPM | SYSTOLIC BLOOD PRESSURE: 118 MMHG | OXYGEN SATURATION: 97 %

## 2021-11-02 PROCEDURE — 99214 OFFICE O/P EST MOD 30 MIN: CPT

## 2021-11-02 PROCEDURE — 93000 ELECTROCARDIOGRAM COMPLETE: CPT

## 2021-11-02 NOTE — DISCUSSION/SUMMARY
[FreeTextEntry1] : This is an 86 year-old male with past medical history significant for status post right arm amputation, renal insufficiency, mitral valve regurgitation, hyperlipidemia, who comes in for followup cardiac evaluation. He denies chest pain, shortness of breath, dizziness or syncope. \par The patient had a recent fall where he tripped over carpeting, and was evaluated at a local hospital where he was found to have bradycardia.  His metoprolol dosage was changed to metoprolol tartrate 12.5 mg twice per day.\par He did not have a syncopal episode.\par Electrocardiogram done November 2, 2021 demonstrates sinus bradycardia rate of 50 bpm is otherwise remarkable for first-degree atrioventricular block, is otherwise unremarkable.\par Echo Doppler examination done October 12, 2020 which demonstrated calcified mitral annulus and mild mitral valve regurgitation, mild tricuspid valve regurgitation, sigmoid basal interventricular septum, aortic valve stenosis with a gradient consistent with mild aortic valve stenosis and mild aortic valve regurgitation.  There was thinning and dyskinesis of the the interatrial septum. \par \par Electrocardiogram done January 25, 2021 demonstrates sinus bradycardia rate of 41 bpm is otherwise remarkable for first-degree block.\par \par The patient reports that he is receiving Procrit injections by his nephrologist has also received iron infusion.\par \par The patient understands that aerobic exercises must be increased to 40 minutes 4 times per week. A detailed discussion of lifestyle modification was done today. The patient has a good understanding of the diagnosis, and treatment plan. Lifestyle modification was also outlined.

## 2021-11-02 NOTE — CARDIOLOGY SUMMARY
[___] : [unfilled] [de-identified] : 8/17/2021 [de-identified] : HOLTER: 6/29/2018, 8/17/2021 [de-identified] : NUC: 1/15/2018 [de-identified] : 8/12/2020

## 2021-11-02 NOTE — PHYSICAL EXAM
[Well Developed] : well developed [Well Nourished] : well nourished [No Acute Distress] : no acute distress [Normal Venous Pressure] : normal venous pressure [No Carotid Bruit] : no carotid bruit [Normal S1, S2] : normal S1, S2 [No Murmur] : no murmur [No Rub] : no rub [Clear Lung Fields] : clear lung fields [Good Air Entry] : good air entry [No Respiratory Distress] : no respiratory distress  [Soft] : abdomen soft [Non Tender] : non-tender [No Masses/organomegaly] : no masses/organomegaly [Normal Bowel Sounds] : normal bowel sounds [Normal Gait] : normal gait [No Edema] : no edema [No Cyanosis] : no cyanosis [No Clubbing] : no clubbing [No Varicosities] : no varicosities [No Rash] : no rash [No Skin Lesions] : no skin lesions [Moves all extremities] : moves all extremities [No Focal Deficits] : no focal deficits [Normal Speech] : normal speech [Alert and Oriented] : alert and oriented [Normal memory] : normal memory [General Appearance - Well Developed] : well developed [Normal Appearance] : normal appearance [Well Groomed] : well groomed [General Appearance - Well Nourished] : well nourished [No Deformities] : no deformities [General Appearance - In No Acute Distress] : no acute distress [Normal Conjunctiva] : the conjunctiva exhibited no abnormalities [Normal Oral Mucosa] : normal oral mucosa [Normal Oropharynx] : normal oropharynx [Normal Jugular Venous A Waves Present] : normal jugular venous A waves present [Normal Jugular Venous V Waves Present] : normal jugular venous V waves present [No Jugular Venous Hunter A Waves] : no jugular venous hunter A waves [] : no respiratory distress [Respiration, Rhythm And Depth] : normal respiratory rhythm and effort [Exaggerated Use Of Accessory Muscles For Inspiration] : no accessory muscle use [Auscultation Breath Sounds / Voice Sounds] : lungs were clear to auscultation bilaterally [Bowel Sounds] : normal bowel sounds [Abdomen Soft] : soft [Abnormal Walk] : normal gait [Nail Clubbing] : no clubbing of the fingernails [Cyanosis, Localized] : no localized cyanosis [Skin Color & Pigmentation] : normal skin color and pigmentation [Skin Turgor] : normal skin turgor [Oriented To Time, Place, And Person] : oriented to person, place, and time [Affect] : the affect was normal [Mood] : the mood was normal [No Anxiety] : not feeling anxious [5th Left ICS - MCL] : palpated at the 5th LICS in the midclavicular line [Normal] : normal [No Precordial Heave] : no precordial heave was noted [Bradycardia] : bradycardic [Heart Rate ___] : [unfilled] bpm [Rhythm Regular] : regular [Normal S1] : normal S1 [Normal S2] : normal S2 [No Gallop] : no gallop heard [I] : a grade 1 [2+] : left 2+ [No Abnormalities] : the abdominal aorta was not enlarged and no bruit was heard [___ +] : [unfilled]U+ pitting edema to the right ankle [FreeTextEntry1] : R arm amputation, LUE edema, hematoma [Apical Thrill] : no thrill palpable at the apex [S3] : no S3 [S4] : no S4 [Click] : no click [Pericardial Rub] : no pericardial rub [Right Carotid Bruit] : no bruit heard over the right carotid [Left Carotid Bruit] : no bruit heard over the left carotid

## 2021-11-02 NOTE — REVIEW OF SYSTEMS
[Negative] : Heme/Lymph [FreeTextEntry9] : LUE edema/hematoma [de-identified] : LUE hematoma/edema

## 2021-11-02 NOTE — ASSESSMENT
[FreeTextEntry1] : \par \par \par \par \par \par Prior note nurse practitioner Wiley October 8, 2021::\par \par This is a 86 year year old male here today for follow up cardiac evaluation. \par He has a past medical history significant for status post right arm amputation, renal insufficiency, mitral valve regurgitation, hyperlipidemia.\par \par CHIEF COMPLAINT:\par The patient is here today after s/p fall and hospitalization at [facility] from 9/27/2021 to 10/1/2021. He is here with his wife who states that he slipped on a cardboard box in his driveway. He suffered from LUE injuries (no break but severe swelling and hematoma). He states that his left hand is very swollen and hurts to touch. At times he does have some numbness with pain. The swelling goes from the back of his arm to his fingertips and he does have it wrapped at this time. He states that he does not have anyone following up with his arm and that he was told "it would take time for the swelling and bruising to go down". \par \par He also recently had a 24 hour holter monitor in place prior to his fall on 8/17/2021 which demonstrated NSR with average HR in the 50's, lowest HR 31 BPM with bradycardia occurring 47% of the time. He does follow up with [facility] Dr. Blair and he states that while in the hospital his Metoprolol tartrate dose was changed from 12.5mg PO DAILY to 25mg PO TID. He said that he was told his HR "went really high in the hospital". \par \par As of right now he does not have chest pain or HIDALGO. He does not have palpitations. His primary complaint right now is the pain and swelling in his LUE.\par \par BLOOD PRESSURE:\par Unable to do blood pressure due to LUE arm pain and swelling. \par \par BLOOD WORK:\par -New blood work was done 9/17/2021 which demonstrated normal lipid profile.\par -He is following up with his renal doctor in regards to his kidney function. He remains on Pravastatin 40mg PO DAILY. \par \par CHOLESTEROL CONTROL:\par -Patient will continue the advised  TLC diet and to continue follow-up for treatment of hyperlipidemia and repeat blood testing with diet and exercise. I have discussed different exercises and the importance of maintenance of optimal body weight. The importance of staying within guidelines and recommendations was stressed to the patient today and they acknowledged that they understand this to me verbally.\par \par  -Mr. CASTILLO was educated and advised that failure to follow-up with my medical recommendations to lower cholesterol can result in severe health consequences therefore, they will continuing a low saturated and low fat diet and to avoid excessive carbohydrates to help reduce triglycerides and that lowering LDL levels is associated with a significant decrease in serious cardiac events including but not limited to heart attack stroke and overall death. We will continue lipid lowering agents as advised based on blood test results and the patient understands to call if they develop severe muscle discomfort or if they have a reddish tinted discoloration in their urine.\par \par TESTING/REPORTS:\par -EKG done today 10/08/2021 which demonstrated regular sinus rhythm with nonspecific ST-T wave changes BPM of 47 with 1st degree AV block. \par \par -EKG done Aug 17, 2021 which demonstrated regular sinus rhythm with nonspecific ST-T wave changes, BPM of 51 with 1st degree AV block (unchanged from his baseline EKG). \par \par -He had an echo Doppler examination done October 12, 2020 which demonstrated calcified mitral annulus and mild mitral valve regurgitation, mild tricuspid valve regurgitation, sigmoid basal interventricular septum, aortic valve stenosis with a gradient consistent with mild aortic valve stenosis and mild aortic valve regurgitation.  There was thinning and dyskinesis of the the interatrial septum. \par \par -The patient had a 24 hour holter monitor done on 8/17/2021 which demonstrated NSR with bradycardia 47% of he time with HR ranges 31-77 (average 54 BPM). The patient does follow up closely with Dr. Murphy in [facility].\par \par -WALDEMAR done during his hospitalization.\par \par PLAN:\par -Start Metoprolol ER 12.5mg PO BID in place of the 25mg PO TID.\par -Follow up with EPS. Dr Murphy. \par -Follow up with Vascular and I provided him with Dr. Uriel baron to evaluate his LUE.\par \par I have discussed the plan of care with Mr. KIEL CASTILLO  and he  will follow up in 1 month. He is compliant with all of his medications.\par \par The patient understands that aerobic exercises must be increased to minutes 4 times/week and a detailed discussion of lifestyle modification was done today. \par The patient has a good understanding of the diagnosis, treatment plan and lifestyle modification. \par He will contact me at the office for any questions with their care or any changes in their health status.\par \par The patient was seen together with supervision physician Dr. George Mendoza and the plan of care was discussed with the patient and will be carried out as noted above.\par \par Bunny RALPH

## 2022-04-27 ENCOUNTER — APPOINTMENT (OUTPATIENT)
Dept: CARDIOLOGY | Facility: CLINIC | Age: 87
End: 2022-04-27

## 2022-05-20 ENCOUNTER — NON-APPOINTMENT (OUTPATIENT)
Age: 87
End: 2022-05-20

## 2022-05-20 ENCOUNTER — APPOINTMENT (OUTPATIENT)
Dept: CARDIOLOGY | Facility: CLINIC | Age: 87
End: 2022-05-20
Payer: MEDICARE

## 2022-05-20 VITALS
OXYGEN SATURATION: 97 % | HEART RATE: 49 BPM | SYSTOLIC BLOOD PRESSURE: 112 MMHG | TEMPERATURE: 97.8 F | BODY MASS INDEX: 21.54 KG/M2 | HEIGHT: 72 IN | DIASTOLIC BLOOD PRESSURE: 70 MMHG | RESPIRATION RATE: 16 BRPM | WEIGHT: 159 LBS

## 2022-05-20 DIAGNOSIS — Z86.79 PERSONAL HISTORY OF OTHER DISEASES OF THE CIRCULATORY SYSTEM: ICD-10-CM

## 2022-05-20 DIAGNOSIS — I34.0 NONRHEUMATIC MITRAL (VALVE) INSUFFICIENCY: ICD-10-CM

## 2022-05-20 DIAGNOSIS — I10 ESSENTIAL (PRIMARY) HYPERTENSION: ICD-10-CM

## 2022-05-20 DIAGNOSIS — I35.0 NONRHEUMATIC AORTIC (VALVE) STENOSIS: ICD-10-CM

## 2022-05-20 DIAGNOSIS — R94.31 ABNORMAL ELECTROCARDIOGRAM [ECG] [EKG]: ICD-10-CM

## 2022-05-20 DIAGNOSIS — R00.1 BRADYCARDIA, UNSPECIFIED: ICD-10-CM

## 2022-05-20 DIAGNOSIS — I44.0 ATRIOVENTRICULAR BLOCK, FIRST DEGREE: ICD-10-CM

## 2022-05-20 DIAGNOSIS — E78.5 HYPERLIPIDEMIA, UNSPECIFIED: ICD-10-CM

## 2022-05-20 PROCEDURE — 99214 OFFICE O/P EST MOD 30 MIN: CPT | Mod: 25

## 2022-05-20 PROCEDURE — 93000 ELECTROCARDIOGRAM COMPLETE: CPT

## 2022-05-20 RX ORDER — AMLODIPINE BESYLATE 5 MG/1
5 TABLET ORAL DAILY
Qty: 90 | Refills: 1 | Status: ACTIVE | COMMUNITY
Start: 2018-10-09 | End: 1900-01-01

## 2022-05-20 RX ORDER — PRAVASTATIN SODIUM 40 MG/1
40 TABLET ORAL
Qty: 90 | Refills: 1 | Status: ACTIVE | COMMUNITY
Start: 2021-05-18 | End: 1900-01-01

## 2022-05-20 RX ORDER — METOPROLOL TARTRATE 25 MG/1
25 TABLET, FILM COATED ORAL
Qty: 90 | Refills: 1 | Status: ACTIVE | COMMUNITY
Start: 2020-09-08 | End: 1900-01-01

## 2022-05-20 NOTE — ASSESSMENT
[FreeTextEntry1] : This is a 86 year year old male here today for follow up cardiac evaluation. \par He has a past medical history significant for status post right arm amputation, renal insufficiency, mitral valve regurgitation, hyperlipidemia.\par \par CHIEF COMPLAINT:\par Today he is feeling generally well and does not have any complaints at this time. He remains on Amlodipine 5mg PO DAILY, ASA 81mg PO DAILY, Metoprolol ER 12.5mg in the AM and 12.5mg PO DAILY in the PM, and on Pravastatin 40mg PO DAILY. \par \par BLOOD PRESSURE:\par -Blood pressure is well controlled on today's exam.\par \par BLOOD WORK:\par -Blood work done on 3/14/22 demonstrated Cholesterol of 104 and LDL of 45.\par -Blood work was done 9/17/2021 which demonstrated normal lipid profile.\par -He is following up with his renal doctor in regards to his kidney function. \par -He remains on Pravastatin 40mg PO DAILY. \par -Blood work script given to the patient to have blood work done prior to her next follow up appointment. \par \par CHOLESTEROL CONTROL:\par -Patient will continue the advised  TLC diet and to continue follow-up for treatment of hyperlipidemia and repeat blood testing with diet and exercise. I have discussed different exercises and the importance of maintenance of optimal body weight. The importance of staying within guidelines and recommendations was stressed to the patient today and they acknowledged that they understand this to me verbally.\par \par  -Mr. CASTILLO was educated and advised that failure to follow-up with my medical recommendations to lower cholesterol can result in severe health consequences therefore, they will continuing a low saturated and low fat diet and to avoid excessive carbohydrates to help reduce triglycerides and that lowering LDL levels is associated with a significant decrease in serious cardiac events including but not limited to heart attack stroke and overall death. We will continue lipid lowering agents as advised based on blood test results and the patient understands to call if they develop severe muscle discomfort or if they have a reddish tinted discoloration in their urine.\par \par TESTING/REPORTS:\par -EKG done today 05/20/2022 which demonstrated regular sinus rhythm with nonspecific ST-T wave changes BPM of 46 with 1st degree AV block (this is his baseline EKG).\par \par -EKG done 10/08/2021 which demonstrated regular sinus rhythm with nonspecific ST-T wave changes BPM of 47 with 1st degree AV block. \par \par -EKG done Aug 17, 2021 which demonstrated regular sinus rhythm with nonspecific ST-T wave changes, BPM of 51 with 1st degree AV block (unchanged from his baseline EKG). \par \par -He had an echo Doppler examination done October 12, 2020 which demonstrated calcified mitral annulus and mild mitral valve regurgitation, mild tricuspid valve regurgitation, sigmoid basal interventricular septum, aortic valve stenosis with a gradient consistent with mild aortic valve stenosis and mild aortic valve regurgitation.  There was thinning and dyskinesis of the the interatrial septum. \par \par -The patient had a 24 hour holter monitor done on 8/17/2021 which demonstrated NSR with bradycardia 47% of he time with HR ranges 31-77 (average 54 BPM). The patient does follow up closely with Dr. Murphy in [facility].\par \par -WALDEMAR done during his hospitalization.\par \par PMH:\par s/p fall and hospitalization at [facility] from 9/27/2021 to 10/1/2021. He is here with his wife who states that he slipped on a cardboard box in his driveway. He suffered from LUE injuries (no break but severe swelling and hematoma). He states that his left hand is very swollen and hurts to touch. At times he does have some numbness with pain. The swelling goes from the back of his arm to his fingertips and he does have it wrapped at this time. He states that he does not have anyone following up with his arm and that he was told "it would take time for the swelling and bruising to go down". \par \par He also recently had a 24 hour holter monitor in place prior to his fall on 8/17/2021 which demonstrated NSR with average HR in the 50's, lowest HR 31 BPM with bradycardia occurring 47% of the time. He does follow up with [facility] Dr. Blair and he states that while in the hospital his Metoprolol tartrate dose was changed from 12.5mg PO DAILY to 25mg PO TID. He said that he was told his HR "went really high in the hospital". \par \par PLAN:\par -He will continue with his usual medications and will contact the office if he is having any complaints between now and their next follow up appointment.\par -The patient will schedule an Echo Doppler examination to evaluate murmur, left ventricular function, chamber size, and rule out hypertrophy. \par \par I have discussed the plan of care with Mr. KIEL CASTILLO  and he  will follow up in 1 month. He is compliant with all of his medications.\par \par The patient understands that aerobic exercises must be increased to minutes 4 times/week and a detailed discussion of lifestyle modification was done today. \par The patient has a good understanding of the diagnosis, treatment plan and lifestyle modification. \par He will contact me at the office for any questions with their care or any changes in their health status.\par \par The patient was seen together with supervision physician Dr. George Mendoza and the plan of care was discussed with the patient and will be carried out as noted above.\par \par Bunny RALPH

## 2022-05-20 NOTE — REVIEW OF SYSTEMS
[Negative] : Heme/Lymph [FreeTextEntry9] : LUE edema/hematoma [de-identified] : LUE hematoma/edema

## 2022-05-20 NOTE — DISCUSSION/SUMMARY
[FreeTextEntry1] : Dr. Mendoza-(PRIOR VISIT and PMH WITH Dr. Mendoza): \par This is an 86 year-old male with past medical history significant for status post right arm amputation, renal insufficiency, mitral valve regurgitation, hyperlipidemia, who comes in for followup cardiac evaluation. He denies chest pain, shortness of breath, dizziness or syncope. \par \par The patient had a recent fall where he tripped over carpeting, and was evaluated at a local hospital where he was found to have bradycardia.  His metoprolol dosage was changed to metoprolol tartrate 12.5 mg twice per day.\par \par He did not have a syncopal episode.\par \par Electrocardiogram done November 2, 2021 demonstrates sinus bradycardia rate of 50 bpm is otherwise remarkable for first-degree atrioventricular block, is otherwise unremarkable.\par \par Echo Doppler examination done October 12, 2020 which demonstrated calcified mitral annulus and mild mitral valve regurgitation, mild tricuspid valve regurgitation, sigmoid basal interventricular septum, aortic valve stenosis with a gradient consistent with mild aortic valve stenosis and mild aortic valve regurgitation.  There was thinning and dyskinesis of the the interatrial septum. \par \par Electrocardiogram done January 25, 2021 demonstrates sinus bradycardia rate of 41 bpm is otherwise remarkable for first-degree block.\par \par The patient reports that he is receiving Procrit injections by his nephrologist has also received iron infusion.\par \par The patient understands that aerobic exercises must be increased to 40 minutes 4 times per week. A detailed discussion of lifestyle modification was done today. The patient has a good understanding of the diagnosis, and treatment plan. Lifestyle modification was also outlined.

## 2022-05-20 NOTE — CARDIOLOGY SUMMARY
[___] : [unfilled] [de-identified] : 5/20/22 [de-identified] : HOLTER: 6/29/2018, 8/17/2021 [de-identified] : NUC: 1/15/2018 [de-identified] : 8/12/2020

## 2022-11-22 ENCOUNTER — APPOINTMENT (OUTPATIENT)
Dept: CARDIOLOGY | Facility: CLINIC | Age: 87
End: 2022-11-22